# Patient Record
Sex: MALE | Race: WHITE | Employment: FULL TIME
[De-identification: names, ages, dates, MRNs, and addresses within clinical notes are randomized per-mention and may not be internally consistent; named-entity substitution may affect disease eponyms.]

---

## 2024-11-11 SDOH — HEALTH STABILITY: PHYSICAL HEALTH: ON AVERAGE, HOW MANY MINUTES DO YOU ENGAGE IN EXERCISE AT THIS LEVEL?: 10 MIN

## 2024-11-11 SDOH — HEALTH STABILITY: PHYSICAL HEALTH: ON AVERAGE, HOW MANY DAYS PER WEEK DO YOU ENGAGE IN MODERATE TO STRENUOUS EXERCISE (LIKE A BRISK WALK)?: 1 DAY

## 2024-11-12 ENCOUNTER — OFFICE VISIT (OUTPATIENT)
Dept: PRIMARY CARE CLINIC | Facility: CLINIC | Age: 39
End: 2024-11-12

## 2024-11-12 VITALS
SYSTOLIC BLOOD PRESSURE: 132 MMHG | DIASTOLIC BLOOD PRESSURE: 88 MMHG | OXYGEN SATURATION: 99 % | WEIGHT: 239 LBS | HEIGHT: 71 IN | BODY MASS INDEX: 33.46 KG/M2 | HEART RATE: 86 BPM

## 2024-11-12 DIAGNOSIS — Z00.00 ENCOUNTER FOR MEDICAL EXAMINATION TO ESTABLISH CARE: ICD-10-CM

## 2024-11-12 DIAGNOSIS — F41.9 ANXIETY AND DEPRESSION: Primary | ICD-10-CM

## 2024-11-12 DIAGNOSIS — E66.9 OBESITY, UNSPECIFIED CLASS, UNSPECIFIED OBESITY TYPE, UNSPECIFIED WHETHER SERIOUS COMORBIDITY PRESENT: ICD-10-CM

## 2024-11-12 DIAGNOSIS — F32.A ANXIETY AND DEPRESSION: Primary | ICD-10-CM

## 2024-11-12 DIAGNOSIS — G47.33 OSA (OBSTRUCTIVE SLEEP APNEA): ICD-10-CM

## 2024-11-12 DIAGNOSIS — Z82.49 FAMILY HISTORY OF EARLY CAD: ICD-10-CM

## 2024-11-12 RX ORDER — CETIRIZINE HYDROCHLORIDE 10 MG/1
10 TABLET ORAL DAILY
COMMUNITY

## 2024-11-12 SDOH — ECONOMIC STABILITY: FOOD INSECURITY: WITHIN THE PAST 12 MONTHS, THE FOOD YOU BOUGHT JUST DIDN'T LAST AND YOU DIDN'T HAVE MONEY TO GET MORE.: NEVER TRUE

## 2024-11-12 SDOH — ECONOMIC STABILITY: FOOD INSECURITY: WITHIN THE PAST 12 MONTHS, YOU WORRIED THAT YOUR FOOD WOULD RUN OUT BEFORE YOU GOT MONEY TO BUY MORE.: NEVER TRUE

## 2024-11-12 SDOH — ECONOMIC STABILITY: INCOME INSECURITY: HOW HARD IS IT FOR YOU TO PAY FOR THE VERY BASICS LIKE FOOD, HOUSING, MEDICAL CARE, AND HEATING?: NOT VERY HARD

## 2024-11-12 ASSESSMENT — COLUMBIA-SUICIDE SEVERITY RATING SCALE - C-SSRS
1. WITHIN THE PAST MONTH, HAVE YOU WISHED YOU WERE DEAD OR WISHED YOU COULD GO TO SLEEP AND NOT WAKE UP?: YES
6. HAVE YOU EVER DONE ANYTHING, STARTED TO DO ANYTHING, OR PREPARED TO DO ANYTHING TO END YOUR LIFE?: NO
2. HAVE YOU ACTUALLY HAD ANY THOUGHTS OF KILLING YOURSELF?: NO

## 2024-11-12 ASSESSMENT — PATIENT HEALTH QUESTIONNAIRE - PHQ9
8. MOVING OR SPEAKING SO SLOWLY THAT OTHER PEOPLE COULD HAVE NOTICED. OR THE OPPOSITE, BEING SO FIGETY OR RESTLESS THAT YOU HAVE BEEN MOVING AROUND A LOT MORE THAN USUAL: SEVERAL DAYS
1. LITTLE INTEREST OR PLEASURE IN DOING THINGS: MORE THAN HALF THE DAYS
SUM OF ALL RESPONSES TO PHQ QUESTIONS 1-9: 22
4. FEELING TIRED OR HAVING LITTLE ENERGY: NEARLY EVERY DAY
5. POOR APPETITE OR OVEREATING: NEARLY EVERY DAY
10. IF YOU CHECKED OFF ANY PROBLEMS, HOW DIFFICULT HAVE THESE PROBLEMS MADE IT FOR YOU TO DO YOUR WORK, TAKE CARE OF THINGS AT HOME, OR GET ALONG WITH OTHER PEOPLE: VERY DIFFICULT
SUM OF ALL RESPONSES TO PHQ QUESTIONS 1-9: 22
9. THOUGHTS THAT YOU WOULD BE BETTER OFF DEAD, OR OF HURTING YOURSELF: MORE THAN HALF THE DAYS
3. TROUBLE FALLING OR STAYING ASLEEP: NEARLY EVERY DAY
SUM OF ALL RESPONSES TO PHQ QUESTIONS 1-9: 20
7. TROUBLE CONCENTRATING ON THINGS, SUCH AS READING THE NEWSPAPER OR WATCHING TELEVISION: NEARLY EVERY DAY
6. FEELING BAD ABOUT YOURSELF - OR THAT YOU ARE A FAILURE OR HAVE LET YOURSELF OR YOUR FAMILY DOWN: NEARLY EVERY DAY
SUM OF ALL RESPONSES TO PHQ9 QUESTIONS 1 & 2: 4
2. FEELING DOWN, DEPRESSED OR HOPELESS: MORE THAN HALF THE DAYS
SUM OF ALL RESPONSES TO PHQ QUESTIONS 1-9: 22

## 2024-11-12 ASSESSMENT — ENCOUNTER SYMPTOMS: RESPIRATORY NEGATIVE: 1

## 2024-11-12 NOTE — PROGRESS NOTES
CATHY (obstructive sleep apnea)  [facility] Sleep Medicine, [facility]           The patient and/or patient representative voiced understanding and agreement with the current diagnoses, recommendations, and possible side effects.    No follow-up provider specified.      Anupama Cabrera MD

## 2024-11-26 ENCOUNTER — TELEPHONE (OUTPATIENT)
Age: 39
End: 2024-11-26

## 2024-11-27 ENCOUNTER — INITIAL CONSULT (OUTPATIENT)
Age: 39
End: 2024-11-27
Payer: COMMERCIAL

## 2024-11-27 VITALS
DIASTOLIC BLOOD PRESSURE: 84 MMHG | SYSTOLIC BLOOD PRESSURE: 126 MMHG | HEIGHT: 71 IN | WEIGHT: 235.6 LBS | BODY MASS INDEX: 32.98 KG/M2 | HEART RATE: 62 BPM

## 2024-11-27 DIAGNOSIS — R01.1 HEART MURMUR: ICD-10-CM

## 2024-11-27 DIAGNOSIS — Z76.89 ENCOUNTER TO ESTABLISH CARE WITH NEW DOCTOR: Primary | ICD-10-CM

## 2024-11-27 PROCEDURE — 93000 ELECTROCARDIOGRAM COMPLETE: CPT | Performed by: INTERNAL MEDICINE

## 2024-11-27 PROCEDURE — 99204 OFFICE O/P NEW MOD 45 MIN: CPT | Performed by: INTERNAL MEDICINE

## 2024-11-27 ASSESSMENT — ENCOUNTER SYMPTOMS
SHORTNESS OF BREATH: 0
ABDOMINAL PAIN: 0

## 2024-11-27 NOTE — PROGRESS NOTES
[facility] CARDIOLOGY  [address] [place] [address], [address]  [place] [address]  PHONE: [phone]      24    NAME:  Eric Martino  : 1985  MRN: [id number]         SUBJECTIVE:   Eric Martino is a 39 y.o. male seen for a follow up visit regarding the following:     Chief Complaint   Patient presents with    Consultation     Family history of early CAD            HPI:  Follow up  Consultation (Family history of early CAD)   .    Mr. Martino presents today for follow-up.  Patient was referred here for evaluation of a murmur.  He has a family history of subaortic membrane.  His father has been treated for this, at least 1 paternal uncle and likely to have had a subaortic membrane.  Patient has no other prior cardiac history.  He denies chest pain, shortness of breath, orthopnea, PND, palpitations, syncope or extremity swelling.  Denies any recent hospitalizations.  He is a non-smoker.  No history of hypertension, hyperlipidemia or diabetes.                      Past Medical History, Past Surgical History, Family history, Social History, and Medications were all reviewed with the patient today and updated as necessary.     Prior to Admission medications    Medication Sig Start Date End Date Taking? Authorizing Provider   cetirizine (ZYRTEC) 10 MG tablet Take 1 tablet by mouth daily   Yes Provider, MD Reuben   sertraline (ZOLOFT) 50 MG tablet Take 1 tablet by mouth daily 24  Yes Anupama Cabrera MD     Allergies   Allergen Reactions    Sulfa Antibiotics Anaphylaxis     Past Medical History:   Diagnosis Date    Depression     Headache      No past surgical history on file.  Family History   Problem Relation Age of Onset    Depression Mother     Diabetes Mother     Diabetes Father     Heart Disease Father     Cancer Maternal Grandfather         Liver    Alcohol Abuse Maternal Grandmother     Heart Disease Paternal Uncle      Social History     Tobacco Use    Smoking status: Never     Passive exposure:

## 2024-12-03 ASSESSMENT — ANXIETY QUESTIONNAIRES
5. BEING SO RESTLESS THAT IT IS HARD TO SIT STILL: SEVERAL DAYS
7. FEELING AFRAID AS IF SOMETHING AWFUL MIGHT HAPPEN: NEARLY EVERY DAY
3. WORRYING TOO MUCH ABOUT DIFFERENT THINGS: MORE THAN HALF THE DAYS
2. NOT BEING ABLE TO STOP OR CONTROL WORRYING: MORE THAN HALF THE DAYS
4. TROUBLE RELAXING: MORE THAN HALF THE DAYS
1. FEELING NERVOUS, ANXIOUS, OR ON EDGE: MORE THAN HALF THE DAYS
3. WORRYING TOO MUCH ABOUT DIFFERENT THINGS: MORE THAN HALF THE DAYS
IF YOU CHECKED OFF ANY PROBLEMS ON THIS QUESTIONNAIRE, HOW DIFFICULT HAVE THESE PROBLEMS MADE IT FOR YOU TO DO YOUR WORK, TAKE CARE OF THINGS AT HOME, OR GET ALONG WITH OTHER PEOPLE: SOMEWHAT DIFFICULT
GAD7 TOTAL SCORE: 14
6. BECOMING EASILY ANNOYED OR IRRITABLE: MORE THAN HALF THE DAYS
2. NOT BEING ABLE TO STOP OR CONTROL WORRYING: MORE THAN HALF THE DAYS
5. BEING SO RESTLESS THAT IT IS HARD TO SIT STILL: SEVERAL DAYS
4. TROUBLE RELAXING: MORE THAN HALF THE DAYS
IF YOU CHECKED OFF ANY PROBLEMS ON THIS QUESTIONNAIRE, HOW DIFFICULT HAVE THESE PROBLEMS MADE IT FOR YOU TO DO YOUR WORK, TAKE CARE OF THINGS AT HOME, OR GET ALONG WITH OTHER PEOPLE: SOMEWHAT DIFFICULT
7. FEELING AFRAID AS IF SOMETHING AWFUL MIGHT HAPPEN: NEARLY EVERY DAY
1. FEELING NERVOUS, ANXIOUS, OR ON EDGE: MORE THAN HALF THE DAYS
6. BECOMING EASILY ANNOYED OR IRRITABLE: MORE THAN HALF THE DAYS

## 2024-12-03 ASSESSMENT — PATIENT HEALTH QUESTIONNAIRE - PHQ9
9. THOUGHTS THAT YOU WOULD BE BETTER OFF DEAD, OR OF HURTING YOURSELF: SEVERAL DAYS
3. TROUBLE FALLING OR STAYING ASLEEP: NEARLY EVERY DAY
7. TROUBLE CONCENTRATING ON THINGS, SUCH AS READING THE NEWSPAPER OR WATCHING TELEVISION: MORE THAN HALF THE DAYS
4. FEELING TIRED OR HAVING LITTLE ENERGY: NEARLY EVERY DAY
3. TROUBLE FALLING OR STAYING ASLEEP: NEARLY EVERY DAY
5. POOR APPETITE OR OVEREATING: MORE THAN HALF THE DAYS
9. THOUGHTS THAT YOU WOULD BE BETTER OFF DEAD, OR OF HURTING YOURSELF: SEVERAL DAYS
4. FEELING TIRED OR HAVING LITTLE ENERGY: NEARLY EVERY DAY
1. LITTLE INTEREST OR PLEASURE IN DOING THINGS: SEVERAL DAYS
10. IF YOU CHECKED OFF ANY PROBLEMS, HOW DIFFICULT HAVE THESE PROBLEMS MADE IT FOR YOU TO DO YOUR WORK, TAKE CARE OF THINGS AT HOME, OR GET ALONG WITH OTHER PEOPLE: SOMEWHAT DIFFICULT
8. MOVING OR SPEAKING SO SLOWLY THAT OTHER PEOPLE COULD HAVE NOTICED. OR THE OPPOSITE, BEING SO FIGETY OR RESTLESS THAT YOU HAVE BEEN MOVING AROUND A LOT MORE THAN USUAL: MORE THAN HALF THE DAYS
SUM OF ALL RESPONSES TO PHQ QUESTIONS 1-9: 17
6. FEELING BAD ABOUT YOURSELF - OR THAT YOU ARE A FAILURE OR HAVE LET YOURSELF OR YOUR FAMILY DOWN: SEVERAL DAYS
8. MOVING OR SPEAKING SO SLOWLY THAT OTHER PEOPLE COULD HAVE NOTICED. OR THE OPPOSITE - BEING SO FIDGETY OR RESTLESS THAT YOU HAVE BEEN MOVING AROUND A LOT MORE THAN USUAL: MORE THAN HALF THE DAYS
7. TROUBLE CONCENTRATING ON THINGS, SUCH AS READING THE NEWSPAPER OR WATCHING TELEVISION: MORE THAN HALF THE DAYS
SUM OF ALL RESPONSES TO PHQ QUESTIONS 1-9: 16
6. FEELING BAD ABOUT YOURSELF - OR THAT YOU ARE A FAILURE OR HAVE LET YOURSELF OR YOUR FAMILY DOWN: SEVERAL DAYS
2. FEELING DOWN, DEPRESSED OR HOPELESS: MORE THAN HALF THE DAYS
SUM OF ALL RESPONSES TO PHQ QUESTIONS 1-9: 17
5. POOR APPETITE OR OVEREATING: MORE THAN HALF THE DAYS
SUM OF ALL RESPONSES TO PHQ9 QUESTIONS 1 & 2: 3
10. IF YOU CHECKED OFF ANY PROBLEMS, HOW DIFFICULT HAVE THESE PROBLEMS MADE IT FOR YOU TO DO YOUR WORK, TAKE CARE OF THINGS AT HOME, OR GET ALONG WITH OTHER PEOPLE: SOMEWHAT DIFFICULT
1. LITTLE INTEREST OR PLEASURE IN DOING THINGS: SEVERAL DAYS
2. FEELING DOWN, DEPRESSED OR HOPELESS: MORE THAN HALF THE DAYS

## 2024-12-03 ASSESSMENT — COLUMBIA-SUICIDE SEVERITY RATING SCALE - C-SSRS
1. IN THE PAST MONTH, HAVE YOU WISHED YOU WERE DEAD OR WISHED YOU COULD GO TO SLEEP AND NOT WAKE UP?: YES
6. IN YOUR LIFETIME, HAVE YOU EVER DONE ANYTHING, STARTED TO DO ANYTHING, OR PREPARED TO DO ANYTHING TO END YOUR LIFE?: NO
2. IN THE PAST MONTH, HAVE YOU ACTUALLY HAD ANY THOUGHTS OF KILLING YOURSELF?: NO

## 2024-12-04 ENCOUNTER — TELEMEDICINE (OUTPATIENT)
Dept: BEHAVIORAL/MENTAL HEALTH CLINIC | Facility: CLINIC | Age: 39
End: 2024-12-04
Payer: COMMERCIAL

## 2024-12-04 DIAGNOSIS — F41.9 ANXIETY DISORDER, UNSPECIFIED TYPE: Primary | ICD-10-CM

## 2024-12-04 DIAGNOSIS — G47.00 INSOMNIA, UNSPECIFIED TYPE: ICD-10-CM

## 2024-12-04 DIAGNOSIS — F33.1 MAJOR DEPRESSIVE DISORDER, RECURRENT, MODERATE (HCC): ICD-10-CM

## 2024-12-04 PROCEDURE — 90792 PSYCH DIAG EVAL W/MED SRVCS: CPT | Performed by: PSYCHIATRY & NEUROLOGY

## 2024-12-04 RX ORDER — TRAZODONE HYDROCHLORIDE 50 MG/1
50 TABLET, FILM COATED ORAL NIGHTLY
Qty: 30 TABLET | Refills: 1 | Status: SHIPPED | OUTPATIENT
Start: 2024-12-04

## 2024-12-04 NOTE — PROGRESS NOTES
appears stated age  Head: Normocephalic, atraumatic  Eyes: conjunctiva clear, PERRL, EOMI  Ears: Normal structure, intact.  Lungs/Heart: No observed wheezing, respirations unlabored. Normal chest rise.  Musculoskeletal: Normal strength and range of motion. No abnormal movements.    MENTAL STATUS EXAM:  Orientation: Oriented to person, place, time and situation  Appearance: Well groomed, good hygiene  Psychomotor: No slowing or agitation  Speech: Normal rate, rhythm, tone and quantity, no slurring  Mood: pleasant  Affect: congruent  Thought content: No hallucinations or delusions, paranoia  SI/plan: denies  HI/plan: denies  Thought process: Linear and goal directed  Insight/Judgement: fair / fair  Attention: intact  Memory: grossly intact  Gait: wnl    ASSESSMENT/PLAN:  Eric Martino is a 39 y.o. old male who has a psychiatric history of depression, anxiety who presented for evaluation. Has Zoloft from PCP.   Diagnosis Orders   1. Anxiety disorder, unspecified type  traZODone (DESYREL) 50 MG tablet      2. Major depressive disorder, recurrent, moderate (HCC)        3. Insomnia, unspecified type  traZODone (DESYREL) 50 MG tablet      Psychotherapy:  Defer at this time.  Pertinent labs/imaging:  No results found for: \"TRIG\", \"HDL\", \"LDL\", \"CHOL\", \"GLUCOSE\"  No results found for: \"TSH\", \"TSHFT4\", \"TSHELE\", \"TLG2PHD\", \"TSHHS\"  Questionnaires:   PHQ:      12/3/2024    10:26 PM 11/12/2024     9:35 AM   PHQ-9    Little interest or pleasure in doing things 1 2   Feeling down, depressed, or hopeless 2 2   Trouble falling or staying asleep, or sleeping too much 3 3   Feeling tired or having little energy 3 3   Poor appetite or overeating 2 3   Feeling bad about yourself - or that you are a failure or have let yourself or your family down 1 3   Trouble concentrating on things, such as reading the newspaper or watching television 2 3   Moving or speaking so slowly that other people could have noticed. Or the opposite - being so

## 2024-12-10 ENCOUNTER — TELEMEDICINE (OUTPATIENT)
Dept: PRIMARY CARE CLINIC | Facility: CLINIC | Age: 39
End: 2024-12-10
Payer: COMMERCIAL

## 2024-12-10 DIAGNOSIS — F41.9 ANXIETY AND DEPRESSION: Primary | ICD-10-CM

## 2024-12-10 DIAGNOSIS — E66.9 OBESITY, UNSPECIFIED CLASS, UNSPECIFIED OBESITY TYPE, UNSPECIFIED WHETHER SERIOUS COMORBIDITY PRESENT: ICD-10-CM

## 2024-12-10 DIAGNOSIS — F32.A ANXIETY AND DEPRESSION: Primary | ICD-10-CM

## 2024-12-10 DIAGNOSIS — Z82.49 FAMILY HISTORY OF EARLY CAD: ICD-10-CM

## 2024-12-10 PROCEDURE — 99214 OFFICE O/P EST MOD 30 MIN: CPT | Performed by: INTERNAL MEDICINE

## 2024-12-10 ASSESSMENT — ENCOUNTER SYMPTOMS: RESPIRATORY NEGATIVE: 1

## 2024-12-10 NOTE — PROGRESS NOTES
FOLLOW UP VISIT    Subjective:    Eric Martino (: 1985) is a 39 y.o., male,   No chief complaint on file.      HPI:  HPI  39 year old in to follow up.  He was started on trazadone and he has seen Psych   Depression not suicidal has no thoughts of harming self or others   Anxiety   Insomnia on trazadone  Headache  Allergies on meds  Obesity   CATHY not on CPAP   Family history of CAD uncles AS dying from this  HCM   due for CPE and vaccines     The following portions of the patient's history were reviewed and updated as appropriate:      Past Medical History:   Diagnosis Date    Depression     Headache        No past surgical history on file.    Family History   Problem Relation Age of Onset    Depression Mother     Diabetes Mother     Diabetes Father     Heart Disease Father     Cancer Maternal Grandfather         Liver    Alcohol Abuse Maternal Grandmother     Heart Disease Paternal Uncle        Social History     Socioeconomic History    Marital status:      Spouse name: Not on file    Number of children: Not on file    Years of education: Not on file    Highest education level: Not on file   Occupational History    Not on file   Tobacco Use    Smoking status: Never     Passive exposure: Never    Smokeless tobacco: Never   Substance and Sexual Activity    Alcohol use: Not Currently    Drug use: Never    Sexual activity: Yes     Partners: Female   Other Topics Concern    Not on file   Social History Narrative    Not on file     Social Determinants of Health     Financial Resource Strain: Low Risk  (2024)    Overall Financial Resource Strain (CARDIA)     Difficulty of Paying Living Expenses: Not very hard   Food Insecurity: No Food Insecurity (2024)    Hunger Vital Sign     Worried About Running Out of Food in the Last Year: Never true     Ran Out of Food in the Last Year: Never true   Transportation Needs: Unknown (2024)    PRAPARE - Transportation     Lack of Transportation

## 2024-12-11 ENCOUNTER — TELEMEDICINE (OUTPATIENT)
Dept: BEHAVIORAL/MENTAL HEALTH CLINIC | Facility: CLINIC | Age: 39
End: 2024-12-11
Payer: COMMERCIAL

## 2024-12-11 DIAGNOSIS — G47.00 INSOMNIA, UNSPECIFIED TYPE: ICD-10-CM

## 2024-12-11 DIAGNOSIS — F41.9 ANXIETY DISORDER, UNSPECIFIED TYPE: ICD-10-CM

## 2024-12-11 DIAGNOSIS — F33.0 MILD EPISODE OF RECURRENT MAJOR DEPRESSIVE DISORDER (HCC): Primary | ICD-10-CM

## 2024-12-11 PROCEDURE — 99214 OFFICE O/P EST MOD 30 MIN: CPT | Performed by: PSYCHIATRY & NEUROLOGY

## 2024-12-11 RX ORDER — BUPROPION HYDROCHLORIDE 100 MG/1
100 TABLET, EXTENDED RELEASE ORAL 2 TIMES DAILY
Qty: 60 TABLET | Refills: 1 | Status: SHIPPED | OUTPATIENT
Start: 2024-12-11

## 2024-12-11 ASSESSMENT — ANXIETY QUESTIONNAIRES
6. BECOMING EASILY ANNOYED OR IRRITABLE: MORE THAN HALF THE DAYS
IF YOU CHECKED OFF ANY PROBLEMS ON THIS QUESTIONNAIRE, HOW DIFFICULT HAVE THESE PROBLEMS MADE IT FOR YOU TO DO YOUR WORK, TAKE CARE OF THINGS AT HOME, OR GET ALONG WITH OTHER PEOPLE: SOMEWHAT DIFFICULT
7. FEELING AFRAID AS IF SOMETHING AWFUL MIGHT HAPPEN: SEVERAL DAYS
3. WORRYING TOO MUCH ABOUT DIFFERENT THINGS: SEVERAL DAYS
GAD7 TOTAL SCORE: 8
3. WORRYING TOO MUCH ABOUT DIFFERENT THINGS: SEVERAL DAYS
4. TROUBLE RELAXING: SEVERAL DAYS
IF YOU CHECKED OFF ANY PROBLEMS ON THIS QUESTIONNAIRE, HOW DIFFICULT HAVE THESE PROBLEMS MADE IT FOR YOU TO DO YOUR WORK, TAKE CARE OF THINGS AT HOME, OR GET ALONG WITH OTHER PEOPLE: SOMEWHAT DIFFICULT
2. NOT BEING ABLE TO STOP OR CONTROL WORRYING: SEVERAL DAYS
1. FEELING NERVOUS, ANXIOUS, OR ON EDGE: SEVERAL DAYS
2. NOT BEING ABLE TO STOP OR CONTROL WORRYING: SEVERAL DAYS
5. BEING SO RESTLESS THAT IT IS HARD TO SIT STILL: SEVERAL DAYS
1. FEELING NERVOUS, ANXIOUS, OR ON EDGE: SEVERAL DAYS
7. FEELING AFRAID AS IF SOMETHING AWFUL MIGHT HAPPEN: SEVERAL DAYS
5. BEING SO RESTLESS THAT IT IS HARD TO SIT STILL: SEVERAL DAYS
6. BECOMING EASILY ANNOYED OR IRRITABLE: MORE THAN HALF THE DAYS
4. TROUBLE RELAXING: SEVERAL DAYS

## 2024-12-11 ASSESSMENT — PATIENT HEALTH QUESTIONNAIRE - PHQ9
8. MOVING OR SPEAKING SO SLOWLY THAT OTHER PEOPLE COULD HAVE NOTICED. OR THE OPPOSITE - BEING SO FIDGETY OR RESTLESS THAT YOU HAVE BEEN MOVING AROUND A LOT MORE THAN USUAL: SEVERAL DAYS
6. FEELING BAD ABOUT YOURSELF - OR THAT YOU ARE A FAILURE OR HAVE LET YOURSELF OR YOUR FAMILY DOWN: SEVERAL DAYS
9. THOUGHTS THAT YOU WOULD BE BETTER OFF DEAD, OR OF HURTING YOURSELF: NOT AT ALL
SUM OF ALL RESPONSES TO PHQ QUESTIONS 1-9: 5
4. FEELING TIRED OR HAVING LITTLE ENERGY: NOT AT ALL
SUM OF ALL RESPONSES TO PHQ QUESTIONS 1-9: 5
SUM OF ALL RESPONSES TO PHQ QUESTIONS 1-9: 5
4. FEELING TIRED OR HAVING LITTLE ENERGY: NOT AT ALL
2. FEELING DOWN, DEPRESSED OR HOPELESS: NOT AT ALL
9. THOUGHTS THAT YOU WOULD BE BETTER OFF DEAD, OR OF HURTING YOURSELF: NOT AT ALL
1. LITTLE INTEREST OR PLEASURE IN DOING THINGS: SEVERAL DAYS
SUM OF ALL RESPONSES TO PHQ9 QUESTIONS 1 & 2: 1
5. POOR APPETITE OR OVEREATING: SEVERAL DAYS
5. POOR APPETITE OR OVEREATING: SEVERAL DAYS
2. FEELING DOWN, DEPRESSED OR HOPELESS: NOT AT ALL
3. TROUBLE FALLING OR STAYING ASLEEP: NOT AT ALL
7. TROUBLE CONCENTRATING ON THINGS, SUCH AS READING THE NEWSPAPER OR WATCHING TELEVISION: SEVERAL DAYS
10. IF YOU CHECKED OFF ANY PROBLEMS, HOW DIFFICULT HAVE THESE PROBLEMS MADE IT FOR YOU TO DO YOUR WORK, TAKE CARE OF THINGS AT HOME, OR GET ALONG WITH OTHER PEOPLE: NOT DIFFICULT AT ALL
10. IF YOU CHECKED OFF ANY PROBLEMS, HOW DIFFICULT HAVE THESE PROBLEMS MADE IT FOR YOU TO DO YOUR WORK, TAKE CARE OF THINGS AT HOME, OR GET ALONG WITH OTHER PEOPLE: NOT DIFFICULT AT ALL
SUM OF ALL RESPONSES TO PHQ QUESTIONS 1-9: 5
1. LITTLE INTEREST OR PLEASURE IN DOING THINGS: SEVERAL DAYS
3. TROUBLE FALLING OR STAYING ASLEEP: NOT AT ALL
SUM OF ALL RESPONSES TO PHQ QUESTIONS 1-9: 5
8. MOVING OR SPEAKING SO SLOWLY THAT OTHER PEOPLE COULD HAVE NOTICED. OR THE OPPOSITE, BEING SO FIGETY OR RESTLESS THAT YOU HAVE BEEN MOVING AROUND A LOT MORE THAN USUAL: SEVERAL DAYS
7. TROUBLE CONCENTRATING ON THINGS, SUCH AS READING THE NEWSPAPER OR WATCHING TELEVISION: SEVERAL DAYS
6. FEELING BAD ABOUT YOURSELF - OR THAT YOU ARE A FAILURE OR HAVE LET YOURSELF OR YOUR FAMILY DOWN: SEVERAL DAYS

## 2024-12-11 NOTE — PROGRESS NOTES
home, or get along with other people? 0 1 2     GAD7:      12/11/2024     2:26 PM 12/3/2024    10:25 PM   TAYLOR-7 SCREENING   Feeling nervous, anxious, or on edge Several days More than half the days   Not being able to stop or control worrying Several days More than half the days   Worrying too much about different things Several days More than half the days   Trouble relaxing Several days More than half the days   Being so restless that it is hard to sit still Several days Several days   Becoming easily annoyed or irritable More than half the days More than half the days   Feeling afraid as if something awful might happen Several days Nearly every day   TAYLOR-7 Total Score 8 14   How difficult have these problems made it for you to do your work, take care of things at home, or get along with other people? Somewhat difficult Somewhat difficult     Return to Clinic: 1 month OR sooner if needed    I have reviewed the SCRIPTS database report for this patient as required for prescription of controlled substances and did not note any discrepancies.  Medication side effects were discussed and benefits v. risks were presented. Treatment plan was discussed and agreed to by patient.  Diagnoses considered in this plan are \"working diagnoses\" and subject to change with additional information obtained over the course of additional time spent with patient and/or additional collateral information.  Currently, considering risks and protective factors, this patient has a:  low risk of suicide  low risk of homicide  Crisis Plan:  Patient encouraged to call 911 and /or go to the closest Emergency Department in case of agitation, severe anxiety, psychosis, margie, suicidal or homicidal urges, worrisome side effects to medications or other emergencies. Patient verbalized understanding of this plan and agreed to it.  This note has been completed using [employer] Software and while attempts have been made to ensure accuracy,

## 2025-01-08 ENCOUNTER — TELEMEDICINE (OUTPATIENT)
Dept: BEHAVIORAL/MENTAL HEALTH CLINIC | Facility: CLINIC | Age: 40
End: 2025-01-08
Payer: COMMERCIAL

## 2025-01-08 DIAGNOSIS — F33.0 MILD EPISODE OF RECURRENT MAJOR DEPRESSIVE DISORDER (HCC): ICD-10-CM

## 2025-01-08 DIAGNOSIS — F41.9 ANXIETY DISORDER, UNSPECIFIED TYPE: ICD-10-CM

## 2025-01-08 DIAGNOSIS — G47.00 INSOMNIA, UNSPECIFIED TYPE: ICD-10-CM

## 2025-01-08 PROCEDURE — 90833 PSYTX W PT W E/M 30 MIN: CPT | Performed by: PSYCHIATRY & NEUROLOGY

## 2025-01-08 PROCEDURE — 99214 OFFICE O/P EST MOD 30 MIN: CPT | Performed by: PSYCHIATRY & NEUROLOGY

## 2025-01-08 RX ORDER — TRAZODONE HYDROCHLORIDE 50 MG/1
50 TABLET, FILM COATED ORAL NIGHTLY
Qty: 30 TABLET | Refills: 1 | Status: SHIPPED | OUTPATIENT
Start: 2025-01-08

## 2025-01-08 RX ORDER — BUPROPION HYDROCHLORIDE 150 MG/1
150 TABLET ORAL EVERY MORNING
Qty: 30 TABLET | Refills: 1 | Status: SHIPPED | OUTPATIENT
Start: 2025-01-08

## 2025-01-08 NOTE — PROGRESS NOTES
PROGRESS NOTE  Patient: Eric Martino         Date: 2025   MR#: [id number]              : 1985  IDENTIFYING INFORMATION: This is a 39 y.o. old male who is a patient whom presents to clinic for initial evaluation.   Eric Martino was evaluated through a synchronous (real-time) audio-video encounter. The patient (or guardian if applicable) is aware that this is a billable service, which includes applicable co-pays. This Virtual Visit was conducted with patient's (and/or legal guardian's) consent. Patient identification was verified, and a caregiver was present when appropriate.   The patient was located at Home: [address] [place], [place] [id number]  Provider was located at Facility (Appt Dept): [address] [place] [id number]  Confirm you are appropriately licensed, registered, or certified to deliver care in the state where the patient is located as indicated above. If you are not or unsure, please re-schedule the visit: Yes, I confirm.   CHIEF COMPLAINT: mood, anxiety  HISTORY OF PRESENT ILLNESS:  Interval History:  Ongoing issues with anxiety and related mood. Continues to have issues with stress from work. Not able to tolerate Zoloft due to sexual side effects. Stopped taking and Wellbutrin. Has been sleeping better with Trazodone. Discussed trial of Wellbutrin XL for mood, anxiety. Active listening was utilized. Encourage coping skills and relaxation methods. Good sleep hygiene measures recommended. [employer] was given. Supportive measures provided.   Current Symptoms  Duration: chronic  Sleep: improved  Appetite: normal  Anxiety: endorses  Mood: fair  Compliance with medications: endorses  Side effects from the medications: denies  Current stressors: teaching - work    Memory changes/ADL's if applicable: baseline  Substance History: EtOH: denies Illicit Substances denies  Family History: mother, brother - depression, suicides or attempts: mother - attempted  substance Abuse: mother -

## 2025-03-04 ENCOUNTER — TELEMEDICINE (OUTPATIENT)
Dept: BEHAVIORAL/MENTAL HEALTH CLINIC | Facility: CLINIC | Age: 40
End: 2025-03-04
Payer: COMMERCIAL

## 2025-03-04 DIAGNOSIS — F43.23 ADJUSTMENT DISORDER WITH MIXED ANXIETY AND DEPRESSED MOOD: Primary | ICD-10-CM

## 2025-03-04 DIAGNOSIS — G47.00 INSOMNIA, UNSPECIFIED TYPE: ICD-10-CM

## 2025-03-04 DIAGNOSIS — F41.9 ANXIETY DISORDER, UNSPECIFIED TYPE: ICD-10-CM

## 2025-03-04 PROCEDURE — 99214 OFFICE O/P EST MOD 30 MIN: CPT | Performed by: PSYCHIATRY & NEUROLOGY

## 2025-03-04 RX ORDER — TRAZODONE HYDROCHLORIDE 50 MG/1
50 TABLET ORAL NIGHTLY
Qty: 90 TABLET | Refills: 1 | Status: SHIPPED | OUTPATIENT
Start: 2025-03-04

## 2025-03-04 ASSESSMENT — PATIENT HEALTH QUESTIONNAIRE - PHQ9
8. MOVING OR SPEAKING SO SLOWLY THAT OTHER PEOPLE COULD HAVE NOTICED. OR THE OPPOSITE, BEING SO FIGETY OR RESTLESS THAT YOU HAVE BEEN MOVING AROUND A LOT MORE THAN USUAL: NOT AT ALL
9. THOUGHTS THAT YOU WOULD BE BETTER OFF DEAD, OR OF HURTING YOURSELF: NOT AT ALL
6. FEELING BAD ABOUT YOURSELF - OR THAT YOU ARE A FAILURE OR HAVE LET YOURSELF OR YOUR FAMILY DOWN: SEVERAL DAYS
2. FEELING DOWN, DEPRESSED OR HOPELESS: SEVERAL DAYS
10. IF YOU CHECKED OFF ANY PROBLEMS, HOW DIFFICULT HAVE THESE PROBLEMS MADE IT FOR YOU TO DO YOUR WORK, TAKE CARE OF THINGS AT HOME, OR GET ALONG WITH OTHER PEOPLE: NOT DIFFICULT AT ALL
8. MOVING OR SPEAKING SO SLOWLY THAT OTHER PEOPLE COULD HAVE NOTICED. OR THE OPPOSITE - BEING SO FIDGETY OR RESTLESS THAT YOU HAVE BEEN MOVING AROUND A LOT MORE THAN USUAL: NOT AT ALL
SUM OF ALL RESPONSES TO PHQ QUESTIONS 1-9: 4
1. LITTLE INTEREST OR PLEASURE IN DOING THINGS: SEVERAL DAYS
5. POOR APPETITE OR OVEREATING: NOT AT ALL
7. TROUBLE CONCENTRATING ON THINGS, SUCH AS READING THE NEWSPAPER OR WATCHING TELEVISION: SEVERAL DAYS
4. FEELING TIRED OR HAVING LITTLE ENERGY: NOT AT ALL
1. LITTLE INTEREST OR PLEASURE IN DOING THINGS: SEVERAL DAYS
SUM OF ALL RESPONSES TO PHQ QUESTIONS 1-9: 4
10. IF YOU CHECKED OFF ANY PROBLEMS, HOW DIFFICULT HAVE THESE PROBLEMS MADE IT FOR YOU TO DO YOUR WORK, TAKE CARE OF THINGS AT HOME, OR GET ALONG WITH OTHER PEOPLE: NOT DIFFICULT AT ALL
7. TROUBLE CONCENTRATING ON THINGS, SUCH AS READING THE NEWSPAPER OR WATCHING TELEVISION: SEVERAL DAYS
SUM OF ALL RESPONSES TO PHQ QUESTIONS 1-9: 4
4. FEELING TIRED OR HAVING LITTLE ENERGY: NOT AT ALL
5. POOR APPETITE OR OVEREATING: NOT AT ALL
2. FEELING DOWN, DEPRESSED OR HOPELESS: SEVERAL DAYS
6. FEELING BAD ABOUT YOURSELF - OR THAT YOU ARE A FAILURE OR HAVE LET YOURSELF OR YOUR FAMILY DOWN: SEVERAL DAYS
3. TROUBLE FALLING OR STAYING ASLEEP: NOT AT ALL
3. TROUBLE FALLING OR STAYING ASLEEP: NOT AT ALL
9. THOUGHTS THAT YOU WOULD BE BETTER OFF DEAD, OR OF HURTING YOURSELF: NOT AT ALL

## 2025-03-04 NOTE — PROGRESS NOTES
half the days   Not being able to stop or control worrying Several days More than half the days   Worrying too much about different things Several days More than half the days   Trouble relaxing Several days More than half the days   Being so restless that it is hard to sit still Several days Several days   Becoming easily annoyed or irritable More than half the days More than half the days   Feeling afraid as if something awful might happen Several days Nearly every day   TAYLOR-7 Total Score 8 14   How difficult have these problems made it for you to do your work, take care of things at home, or get along with other people? Somewhat difficult Somewhat difficult     Return to Clinic: as needed     I have reviewed the SCRIPTS database report for this patient as required for prescription of controlled substances and did not note any discrepancies.  Medication side effects were discussed and benefits v. risks were presented. Treatment plan was discussed and agreed to by patient.  Diagnoses considered in this plan are \"working diagnoses\" and subject to change with additional information obtained over the course of additional time spent with patient and/or additional collateral information.  Currently, considering risks and protective factors, this patient has a:  low risk of suicide  low risk of homicide  Crisis Plan:  Patient encouraged to call 911 and /or go to the closest Emergency Department in case of agitation, severe anxiety, psychosis, margie, suicidal or homicidal urges, worrisome side effects to medications or other emergencies. Patient verbalized understanding of this plan and agreed to it.  This note has been completed using [employer] Dictation Software and while attempts have been made to ensure accuracy, certain words and phrases may not be transcribed as intended.

## 2025-03-18 ENCOUNTER — RESULTS FOLLOW-UP (OUTPATIENT)
Age: 40
End: 2025-03-18

## 2025-03-19 ENCOUNTER — OFFICE VISIT (OUTPATIENT)
Age: 40
End: 2025-03-19
Payer: COMMERCIAL

## 2025-03-19 VITALS
DIASTOLIC BLOOD PRESSURE: 82 MMHG | HEIGHT: 71 IN | BODY MASS INDEX: 34.02 KG/M2 | SYSTOLIC BLOOD PRESSURE: 126 MMHG | HEART RATE: 78 BPM | WEIGHT: 243 LBS

## 2025-03-19 DIAGNOSIS — R01.1 HEART MURMUR: Primary | ICD-10-CM

## 2025-03-19 PROCEDURE — 99213 OFFICE O/P EST LOW 20 MIN: CPT | Performed by: INTERNAL MEDICINE

## 2025-03-19 ASSESSMENT — ENCOUNTER SYMPTOMS
ABDOMINAL PAIN: 0
SHORTNESS OF BREATH: 0

## 2025-03-19 NOTE — PROGRESS NOTES
OUTSIDE RECORDS REVIEW    Records from outside providers have been reviewed and summarized as noted in the HPI, past history and data review sections of this note, and reviewed with patient. .       ASSESSMENT and PLAN    Eric was seen today for heart murmur.    Diagnoses and all orders for this visit:    Heart murmur          IMPRESSION:    Mr. Gomez presents today for follow-up.  He stable from cardiac standpoint today.  His echo did not show any evidence of a subaortic membrane, LVOT obstruction or flow acceleration.  Will plan for as needed follow-up.  He needs no further cardiac testing or treatment at this time.        No follow-ups on file.    Personal time spent with chart review, interview/physical examination, documentation and coordination of care totals 28 minutes.       Thank you for allowing me to participate in this patient's care.  Please call or contact me if there are any questions or concerns regarding the above.      Timothy Edwards III, MD  03/19/25  9:26 AM

## 2025-05-29 ENCOUNTER — OFFICE VISIT (OUTPATIENT)
Dept: PRIMARY CARE CLINIC | Facility: CLINIC | Age: 40
End: 2025-05-29

## 2025-05-29 DIAGNOSIS — F33.1 MAJOR DEPRESSIVE DISORDER, RECURRENT, MODERATE (HCC): ICD-10-CM

## 2025-05-29 DIAGNOSIS — G47.33 OSA (OBSTRUCTIVE SLEEP APNEA): Primary | ICD-10-CM

## 2025-06-17 ENCOUNTER — OFFICE VISIT (OUTPATIENT)
Dept: PRIMARY CARE CLINIC | Facility: CLINIC | Age: 40
End: 2025-06-17
Payer: COMMERCIAL

## 2025-06-17 VITALS
BODY MASS INDEX: 33.6 KG/M2 | SYSTOLIC BLOOD PRESSURE: 132 MMHG | HEIGHT: 71 IN | WEIGHT: 240 LBS | OXYGEN SATURATION: 99 % | DIASTOLIC BLOOD PRESSURE: 82 MMHG | HEART RATE: 87 BPM

## 2025-06-17 DIAGNOSIS — R73.9 HIGH BLOOD SUGAR: ICD-10-CM

## 2025-06-17 DIAGNOSIS — Z00.00 ANNUAL PHYSICAL EXAM: Primary | ICD-10-CM

## 2025-06-17 DIAGNOSIS — L98.9 SKIN DISORDER: ICD-10-CM

## 2025-06-17 DIAGNOSIS — M79.673 PAIN OF FOOT, UNSPECIFIED LATERALITY: ICD-10-CM

## 2025-06-17 DIAGNOSIS — Z00.00 ANNUAL PHYSICAL EXAM: ICD-10-CM

## 2025-06-17 DIAGNOSIS — M25.519 SHOULDER PAIN, UNSPECIFIED CHRONICITY, UNSPECIFIED LATERALITY: ICD-10-CM

## 2025-06-17 DIAGNOSIS — F33.1 MAJOR DEPRESSIVE DISORDER, RECURRENT, MODERATE (HCC): ICD-10-CM

## 2025-06-17 LAB
ALBUMIN SERPL-MCNC: 4.1 G/DL (ref 3.5–5)
ALBUMIN/GLOB SERPL: 1.3 (ref 1–1.9)
ALP SERPL-CCNC: 200 U/L (ref 40–129)
ALT SERPL-CCNC: 206 U/L (ref 8–55)
ANION GAP SERPL CALC-SCNC: 15 MMOL/L (ref 7–16)
APPEARANCE UR: CLEAR
AST SERPL-CCNC: 146 U/L (ref 15–37)
BACTERIA URNS QL MICRO: NEGATIVE /HPF
BASOPHILS # BLD: 0.04 K/UL (ref 0–0.2)
BASOPHILS NFR BLD: 0.6 % (ref 0–2)
BILIRUB SERPL-MCNC: 0.4 MG/DL (ref 0–1.2)
BILIRUB UR QL: NEGATIVE
BUN SERPL-MCNC: 12 MG/DL (ref 6–23)
CALCIUM SERPL-MCNC: 9.8 MG/DL (ref 8.8–10.2)
CASTS URNS QL MICRO: 0 /LPF
CHLORIDE SERPL-SCNC: 99 MMOL/L (ref 98–107)
CHOLEST SERPL-MCNC: 225 MG/DL (ref 0–200)
CO2 SERPL-SCNC: 21 MMOL/L (ref 20–29)
COLOR UR: ABNORMAL
CREAT SERPL-MCNC: 0.9 MG/DL (ref 0.8–1.3)
CRYSTALS URNS QL MICRO: 0 /LPF
DIFFERENTIAL METHOD BLD: ABNORMAL
EOSINOPHIL # BLD: 0.12 K/UL (ref 0–0.8)
EOSINOPHIL NFR BLD: 1.8 % (ref 0.5–7.8)
EPI CELLS #/AREA URNS HPF: ABNORMAL /HPF (ref 0–5)
ERYTHROCYTE [DISTWIDTH] IN BLOOD BY AUTOMATED COUNT: 13.7 % (ref 11.9–14.6)
EST. AVERAGE GLUCOSE BLD GHB EST-MCNC: 243 MG/DL
GLOBULIN SER CALC-MCNC: 3.1 G/DL (ref 2.3–3.5)
GLUCOSE SERPL-MCNC: 435 MG/DL (ref 70–99)
GLUCOSE UR STRIP.AUTO-MCNC: >1000 MG/DL
HBA1C MFR BLD: 10.1 % (ref 0–5.6)
HCT VFR BLD AUTO: 43.7 % (ref 41.1–50.3)
HDLC SERPL-MCNC: 29 MG/DL (ref 40–60)
HDLC SERPL: 7.7 (ref 0–5)
HGB BLD-MCNC: 14.9 G/DL (ref 13.6–17.2)
HGB UR QL STRIP: NEGATIVE
HYALINE CASTS URNS QL MICRO: ABNORMAL /LPF
IMM GRANULOCYTES # BLD AUTO: 0.03 K/UL (ref 0–0.5)
IMM GRANULOCYTES NFR BLD AUTO: 0.4 % (ref 0–5)
KETONES UR QL STRIP.AUTO: NEGATIVE MG/DL
LDLC SERPL CALC-MCNC: ABNORMAL MG/DL (ref 0–100)
LDLC SERPL DIRECT ASSAY-MCNC: 106 MG/DL (ref 0–100)
LEUKOCYTE ESTERASE UR QL STRIP.AUTO: NEGATIVE
LYMPHOCYTES # BLD: 2.55 K/UL (ref 0.5–4.6)
LYMPHOCYTES NFR BLD: 38.1 % (ref 13–44)
MCH RBC QN AUTO: 27.1 PG (ref 26.1–32.9)
MCHC RBC AUTO-ENTMCNC: 34.1 G/DL (ref 31.4–35)
MCV RBC AUTO: 79.5 FL (ref 82–102)
MONOCYTES # BLD: 0.49 K/UL (ref 0.1–1.3)
MONOCYTES NFR BLD: 7.3 % (ref 4–12)
MUCOUS THREADS URNS QL MICRO: 0 /LPF
NEUTS SEG # BLD: 3.46 K/UL (ref 1.7–8.2)
NEUTS SEG NFR BLD: 51.8 % (ref 43–78)
NITRITE UR QL STRIP.AUTO: NEGATIVE
NRBC # BLD: 0 K/UL (ref 0–0.2)
PH UR STRIP: 5.5 (ref 5–9)
PLATELET # BLD AUTO: 226 K/UL (ref 150–450)
PMV BLD AUTO: 11.2 FL (ref 9.4–12.3)
POTASSIUM SERPL-SCNC: 4.4 MMOL/L (ref 3.5–5.1)
PROT SERPL-MCNC: 7.2 G/DL (ref 6.3–8.2)
PROT UR STRIP-MCNC: NEGATIVE MG/DL
RBC # BLD AUTO: 5.5 M/UL (ref 4.23–5.6)
RBC #/AREA URNS HPF: ABNORMAL /HPF (ref 0–5)
SODIUM SERPL-SCNC: 136 MMOL/L (ref 136–145)
SP GR UR REFRACTOMETRY: >1.035 (ref 1–1.02)
TRIGL SERPL-MCNC: 937 MG/DL (ref 0–150)
TSH, 3RD GENERATION: 1.09 UIU/ML (ref 0.27–4.2)
URINE CULTURE IF INDICATED: ABNORMAL
UROBILINOGEN UR QL STRIP.AUTO: 0.2 EU/DL (ref 0.2–1)
VLDLC SERPL CALC-MCNC: 187 MG/DL (ref 6–23)
WBC # BLD AUTO: 6.7 K/UL (ref 4.3–11.1)
WBC URNS QL MICRO: ABNORMAL /HPF (ref 0–4)

## 2025-06-17 PROCEDURE — 99395 PREV VISIT EST AGE 18-39: CPT | Performed by: INTERNAL MEDICINE

## 2025-06-17 PROCEDURE — 99214 OFFICE O/P EST MOD 30 MIN: CPT | Performed by: INTERNAL MEDICINE

## 2025-06-17 SDOH — ECONOMIC STABILITY: FOOD INSECURITY: WITHIN THE PAST 12 MONTHS, YOU WORRIED THAT YOUR FOOD WOULD RUN OUT BEFORE YOU GOT MONEY TO BUY MORE.: NEVER TRUE

## 2025-06-17 SDOH — ECONOMIC STABILITY: FOOD INSECURITY: WITHIN THE PAST 12 MONTHS, THE FOOD YOU BOUGHT JUST DIDN'T LAST AND YOU DIDN'T HAVE MONEY TO GET MORE.: NEVER TRUE

## 2025-06-17 ASSESSMENT — ENCOUNTER SYMPTOMS
DIARRHEA: 0
GASTROINTESTINAL NEGATIVE: 1
STRIDOR: 0
NAUSEA: 0
CHEST TIGHTNESS: 0
BACK PAIN: 0
ABDOMINAL PAIN: 0
ALLERGIC/IMMUNOLOGIC NEGATIVE: 1
SINUS PRESSURE: 0
EYE PAIN: 0
ABDOMINAL DISTENTION: 0
WHEEZING: 0
SHORTNESS OF BREATH: 0
ANAL BLEEDING: 0
EYE DISCHARGE: 0
CONSTIPATION: 0
RECTAL PAIN: 0
EYE ITCHING: 0
COUGH: 0
RESPIRATORY NEGATIVE: 1
BLOOD IN STOOL: 0
CHOKING: 0
COLOR CHANGE: 0
RHINORRHEA: 0
EYES NEGATIVE: 1
EYE REDNESS: 0

## 2025-06-17 NOTE — PROGRESS NOTES
Allergic/Immunologic: Negative.  Negative for environmental allergies, food allergies and immunocompromised state.   Neurological: Negative.  Negative for dizziness, tremors, seizures, syncope, facial asymmetry, light-headedness, numbness and headaches.   Hematological: Negative.    Psychiatric/Behavioral:  Negative for agitation, behavioral problems, confusion, decreased concentration, dysphoric mood, hallucinations, sleep disturbance and suicidal ideas. The patient is not nervous/anxious.    All other systems reviewed and are negative.      Objective:    There were no vitals taken for this visit.    Physical Exam  Vitals and nursing note reviewed.   Constitutional:       General: He is not in acute distress.     Appearance: Normal appearance. He is not ill-appearing or toxic-appearing.   HENT:      Head: Normocephalic and atraumatic.      Right Ear: Tympanic membrane, ear canal and external ear normal.      Left Ear: Tympanic membrane, ear canal and external ear normal.   Eyes:      General: No scleral icterus.        Right eye: No discharge.         Left eye: No discharge.      Extraocular Movements: Extraocular movements intact.      Conjunctiva/sclera: Conjunctivae normal.      Pupils: Pupils are equal, round, and reactive to light.   Cardiovascular:      Rate and Rhythm: Normal rate and regular rhythm.      Pulses: Normal pulses.      Heart sounds: Normal heart sounds. No murmur heard.     No friction rub. No gallop.   Pulmonary:      Effort: Pulmonary effort is normal. No respiratory distress.      Breath sounds: Normal breath sounds. No stridor. No wheezing, rhonchi or rales.   Chest:      Chest wall: No tenderness.   Abdominal:      General: Abdomen is flat. Bowel sounds are normal. There is no distension.      Palpations: There is no mass.      Tenderness: There is no abdominal tenderness. There is no guarding or rebound.      Hernia: No hernia is present.   Musculoskeletal:         General: No swelling,

## 2025-06-18 ENCOUNTER — TELEMEDICINE (OUTPATIENT)
Dept: PRIMARY CARE CLINIC | Facility: CLINIC | Age: 40
End: 2025-06-18
Payer: COMMERCIAL

## 2025-06-18 ENCOUNTER — RESULTS FOLLOW-UP (OUTPATIENT)
Dept: PRIMARY CARE CLINIC | Facility: CLINIC | Age: 40
End: 2025-06-18

## 2025-06-18 DIAGNOSIS — R79.89 ELEVATED LIVER FUNCTION TESTS: ICD-10-CM

## 2025-06-18 DIAGNOSIS — F33.1 MAJOR DEPRESSIVE DISORDER, RECURRENT, MODERATE (HCC): ICD-10-CM

## 2025-06-18 DIAGNOSIS — E11.65 TYPE 2 DIABETES MELLITUS WITH HYPERGLYCEMIA, WITHOUT LONG-TERM CURRENT USE OF INSULIN (HCC): ICD-10-CM

## 2025-06-18 DIAGNOSIS — G47.33 OSA (OBSTRUCTIVE SLEEP APNEA): Primary | ICD-10-CM

## 2025-06-18 DIAGNOSIS — E78.00 HIGH CHOLESTEROL: ICD-10-CM

## 2025-06-18 PROCEDURE — 3046F HEMOGLOBIN A1C LEVEL >9.0%: CPT | Performed by: INTERNAL MEDICINE

## 2025-06-18 PROCEDURE — 99215 OFFICE O/P EST HI 40 MIN: CPT | Performed by: INTERNAL MEDICINE

## 2025-06-18 RX ORDER — ROSUVASTATIN CALCIUM 10 MG/1
10 TABLET, COATED ORAL NIGHTLY
Qty: 30 TABLET | Refills: 3 | Status: SHIPPED | OUTPATIENT
Start: 2025-06-18

## 2025-06-18 ASSESSMENT — ENCOUNTER SYMPTOMS
RESPIRATORY NEGATIVE: 1
GASTROINTESTINAL NEGATIVE: 1

## 2025-06-18 NOTE — PROGRESS NOTES
FOLLOW UP VISIT    Subjective:    Eric Martino (: 1985) is a 39 y.o., male,   Chief Complaint   Patient presents with    Diabetes       HPI:  Diabetes        History of Present Illness  The patient presents via virtual visit for evaluation of diabetes, hypercholesterolemia, and elevated liver function.    Diabetes  - The patient acknowledges a diet high in carbohydrates and sugars.    Hypercholesterolemia  - The patient acknowledges a diet high in carbohydrates and sugars.    Elevated liver function  - The patient has a history of elevated liver function.  - This issue was previously managed by a gastroenterologist during their time in [place].  - The elevated liver function arose due to an incorrect dosage of Zyrtec, leading to excessive intake of the medication.    Supplemental information: They have been unwell for the past 2 weeks.    MEDICATIONS  Current: trazodone, Zyrtec     The following portions of the patient's history were reviewed and updated as appropriate:      Past Medical History:   Diagnosis Date    Depression     Headache        No past surgical history on file.    Family History   Problem Relation Age of Onset    Depression Mother     Diabetes Mother     Diabetes Father     Heart Disease Father     Cancer Maternal Grandfather         Liver    Alcohol Abuse Maternal Grandmother     Heart Disease Paternal Uncle        Social History     Socioeconomic History    Marital status:      Spouse name: Not on file    Number of children: Not on file    Years of education: Not on file    Highest education level: Not on file   Occupational History    Not on file   Tobacco Use    Smoking status: Never     Passive exposure: Never    Smokeless tobacco: Never   Substance and Sexual Activity    Alcohol use: Not Currently    Drug use: Never    Sexual activity: Yes     Partners: Female   Other Topics Concern    Not on file   Social History Narrative    Not on file     Social Drivers of Health

## 2025-06-19 ENCOUNTER — TELEPHONE (OUTPATIENT)
Dept: DIABETES SERVICES | Age: 40
End: 2025-06-19

## 2025-06-19 NOTE — TELEPHONE ENCOUNTER
Type [employer] . Spoke with patient regarding diabetes education consult. Patient currently declines program but provided contact numbers for diabetes educators should they change their mind in the future: [phone] or [phone].

## 2025-06-20 ENCOUNTER — HOSPITAL ENCOUNTER (OUTPATIENT)
Dept: ULTRASOUND IMAGING | Age: 40
Discharge: HOME | End: 2025-06-22
Payer: COMMERCIAL

## 2025-06-20 DIAGNOSIS — R79.89 ELEVATED LIVER FUNCTION TESTS: ICD-10-CM

## 2025-06-20 PROCEDURE — 76700 US EXAM ABDOM COMPLETE: CPT

## 2025-06-23 ENCOUNTER — RESULTS FOLLOW-UP (OUTPATIENT)
Dept: PRIMARY CARE CLINIC | Facility: CLINIC | Age: 40
End: 2025-06-23

## 2025-06-23 DIAGNOSIS — K76.0 FATTY LIVER: ICD-10-CM

## 2025-06-23 DIAGNOSIS — K76.0 FATTY LIVER: Primary | ICD-10-CM

## 2025-06-23 DIAGNOSIS — E11.65 TYPE 2 DIABETES MELLITUS WITH HYPERGLYCEMIA, WITHOUT LONG-TERM CURRENT USE OF INSULIN (HCC): Primary | ICD-10-CM

## 2025-06-23 RX ORDER — GLUCOSAMINE HCL/CHONDROITIN SU 500-400 MG
CAPSULE ORAL
Qty: 400 STRIP | Refills: 0 | Status: SHIPPED | OUTPATIENT
Start: 2025-06-23

## 2025-06-23 RX ORDER — AVOBENZONE, HOMOSALATE, OCTISALATE, OCTOCRYLENE 30; 40; 45; 26 MG/ML; MG/ML; MG/ML; MG/ML
1 CREAM TOPICAL 4 TIMES DAILY
Qty: 100 EACH | Refills: 5 | Status: CANCELLED | OUTPATIENT
Start: 2025-06-23

## 2025-06-23 RX ORDER — AVOBENZONE, HOMOSALATE, OCTISALATE, OCTOCRYLENE 30; 40; 45; 26 MG/ML; MG/ML; MG/ML; MG/ML
CREAM TOPICAL
Qty: 600 EACH | Refills: 1 | Status: SHIPPED | OUTPATIENT
Start: 2025-06-23

## 2025-06-23 RX ORDER — GLUCOSAMINE HCL/CHONDROITIN SU 500-400 MG
CAPSULE ORAL
Qty: 200 STRIP | Refills: 2 | Status: CANCELLED | OUTPATIENT
Start: 2025-06-23

## 2025-06-25 ENCOUNTER — HOSPITAL ENCOUNTER (OUTPATIENT)
Dept: PHYSICAL THERAPY | Age: 40
Setting detail: RECURRING SERIES
Discharge: HOME | End: 2025-06-28
Attending: INTERNAL MEDICINE
Payer: COMMERCIAL

## 2025-06-25 ENCOUNTER — TELEPHONE (OUTPATIENT)
Dept: PRIMARY CARE CLINIC | Facility: CLINIC | Age: 40
End: 2025-06-25

## 2025-06-25 DIAGNOSIS — R29.3 ABNORMAL POSTURE: ICD-10-CM

## 2025-06-25 DIAGNOSIS — M25.511 ACUTE PAIN OF RIGHT SHOULDER: Primary | ICD-10-CM

## 2025-06-25 PROCEDURE — 97530 THERAPEUTIC ACTIVITIES: CPT

## 2025-06-25 PROCEDURE — 97161 PT EVAL LOW COMPLEX 20 MIN: CPT

## 2025-06-25 ASSESSMENT — PAIN SCALES - GENERAL: PAINLEVEL_OUTOF10: 2

## 2025-06-25 NOTE — PROGRESS NOTES
Eric Martino  : 1985  Primary: [employer] ([facility])  Secondary:  [place] [address]  [address]  [facility] [address]  Phone: [phone]  Fax: [phone] Plan Frequency: twice a week for 8 weeks    Plan of Care/Certification Expiration Date: 25      Plan of Care/Certification Expiration Date:  Plan of Care/Certification Expiration Date: 25    Frequency/Duration: Plan Frequency: twice a week for 8 weeks      Time In/Out:   Time In: 1605  Time Out: 1648    PT Visit Info:    Total # of Visits to Date: 1        Visit Count:  1     OUTPATIENT PHYSICAL THERAPY:OP NOTE TYPE: Treatment Note 2025       Charge Capture   Episode  (R shoulder pain)              Treatment Diagnosis:    Acute pain of right shoulder  Abnormal posture  Medical/Referring Diagnosis:  Shoulder pain, unspecified chronicity, unspecified laterality  Pain of foot, unspecified laterality     Referring Physician:  Anupama Cabrera MD MD Orders:  PT Eval and Treat   Return MD Appt:  unspecified  Date of Onset:  Onset Date:  (3 months prior to initial evaluation)     Allergies:   Sulfa antibiotics  Restrictions/Precautions:    None  Interventions Planned See assessment note.        Subjective Assessment: See history from evaluation today.     Visit Note Counter: 1 last registered Total # of Visits to Date: 1        Initial Pain Level:}    2/10  Post Session Pain Level:        (no change in pain reported)/10  Medications Last Reviewed:  2025  Updated Objective Findings:  See Evaluation Note from today  Treatment   In addition to assessment today, the following treatments were provided:   Therapeutic Activity:  ( 13 minutes):  Pt education for initial HEP (scaption, isometric and dynamic shoulder ER), body mechanics, postural awareness (especially while sitting), findings from evaluation, anatomy and physiology of present condition/healing time, and PT role and POC.

## 2025-06-25 NOTE — THERAPY EVALUATION
Increased Pain, Decreased Strength, Decreased Functional Mobility, Decreased [place] with Home Exercise Program, Decreased Posture, Decreased Body Mechanics, Difficulty Sleeping, and Decreased Activity Tolerance/Endurance*     Therapy Prognosis:         Excellent     Initial Assessment Complexity:         Low Complexity    PLAN   Effective Dates: 6/25/2025 TO Plan of Care/Certification Expiration Date: 08/20/25     Frequency/Duration: Plan Frequency: twice a week for 8 weeks     Interventions Planned (Treatment may consist of any combination of the following):    Bed Mobility, Endurance Training, Functional Mobility Training, Home Exercise Program (HEP), Manual Therapy, Neuromuscular Re-education/Strengthening, Pain Management, Therapeutic Activites, Therapeutic Exercise/Strengthening, and Patient/Caregiver Education & Training       Goals: (Goals have been discussed and agreed upon with patient.)  Discharge Goals: Time Frame: 8 weeks  Pt will be independent with HEP in order to increase UE strength/endurance/mobility to improve functional mobility and overall quality of life.  Pt will improve score on the Disabilities of the Arm, Shoulder, and Hand (DASH) Questionnaire to 14/55 in order to improve independence with ADLs and IADLs and to improve overall quality of life.  Pt will report lying on either side for 30 minutes with minimal to no increase in pain in order to improve quality and duration of sleep.             Medical Necessity:   > Patient is expected to demonstrate progress in strength, range of motion, and functional technique to improve ability to perform pain-free ADLs/IADLs and to improve overall quality of life.  Reason For Services/Other Comments:  > Patient continues to require modification of therapeutic interventions to increase complexity of exercises.    Total Duration:  Time In: 1605  Time Out: 1648    Regarding Eric Martino's therapy, I certify that the treatment plan above will be

## 2025-07-01 ENCOUNTER — TELEPHONE (OUTPATIENT)
Dept: PRIMARY CARE CLINIC | Facility: CLINIC | Age: 40
End: 2025-07-01

## 2025-07-15 ENCOUNTER — HOSPITAL ENCOUNTER (OUTPATIENT)
Dept: PHYSICAL THERAPY | Age: 40
Setting detail: RECURRING SERIES
Discharge: HOME | End: 2025-07-18
Attending: INTERNAL MEDICINE
Payer: COMMERCIAL

## 2025-07-15 PROCEDURE — 97110 THERAPEUTIC EXERCISES: CPT

## 2025-07-15 PROCEDURE — 97140 MANUAL THERAPY 1/> REGIONS: CPT

## 2025-07-15 ASSESSMENT — PAIN SCALES - GENERAL: PAINLEVEL_OUTOF10: 2

## 2025-07-15 NOTE — PROGRESS NOTES
Eric Gunner  : 1985  Primary: [employer] ([facility])  Secondary:  [place] [address]  [address]  [facility] [address]  Phone: [phone]  Fax: [phone] Plan Frequency: twice a week for 8 weeks    Plan of Care/Certification Expiration Date: 25      Plan of Care/Certification Expiration Date:  Plan of Care/Certification Expiration Date: 25    Frequency/Duration: Plan Frequency: twice a week for 8 weeks      Time In/Out:   Time In: 802  Time Out: 846    PT Visit Info:    Total # of Visits to Date: 2  No Show: 1        Visit Count:  2     OUTPATIENT PHYSICAL THERAPY:OP NOTE TYPE: Treatment Note 7/15/2025       Charge Capture   Episode  (R shoulder pain)              Treatment Diagnosis:    Acute pain of right shoulder  Abnormal posture  Medical/Referring Diagnosis:  Shoulder pain, unspecified chronicity, unspecified laterality  Pain of foot, unspecified laterality     Referring Physician:  Anupama Cabrera MD MD Orders:  PT Eval and Treat   Return MD Appt:  unspecified  Date of Onset:  Onset Date:  (3 months prior to initial evaluation)     Allergies:   Sulfa antibiotics  Restrictions/Precautions:    None  Interventions Planned See assessment note.        Subjective Assessment: Pt states the shoulder pain is not very different from first session.    Visit Note Counter: 2 last registered Total # of Visits to Date: 2        Initial Pain Level:}    2/10  Post Session Pain Level:       2/10  Medications Last Reviewed:  7/15/2025  Updated Objective Findings:  None Today  Treatment   Therapeutic Exercise: ( 24 minutes):  Exercises per grid below to improve mobility and strength and dynamic movement of right shoulder to improve lifting, carrying, reaching, and overhead activites.  Required visual, verbal, and tactile cues to promote proper body alignment, promote proper body posture, promote proper body mechanics, and promote proper body breathing

## 2025-07-16 ENCOUNTER — OFFICE VISIT (OUTPATIENT)
Dept: PRIMARY CARE CLINIC | Facility: CLINIC | Age: 40
End: 2025-07-16
Payer: COMMERCIAL

## 2025-07-16 ENCOUNTER — PATIENT MESSAGE (OUTPATIENT)
Dept: PRIMARY CARE CLINIC | Facility: CLINIC | Age: 40
End: 2025-07-16

## 2025-07-16 VITALS
WEIGHT: 238.2 LBS | OXYGEN SATURATION: 99 % | HEART RATE: 78 BPM | BODY MASS INDEX: 33.35 KG/M2 | SYSTOLIC BLOOD PRESSURE: 128 MMHG | DIASTOLIC BLOOD PRESSURE: 72 MMHG | HEIGHT: 71 IN

## 2025-07-16 DIAGNOSIS — E11.65 TYPE 2 DIABETES MELLITUS WITH HYPERGLYCEMIA, WITHOUT LONG-TERM CURRENT USE OF INSULIN (HCC): ICD-10-CM

## 2025-07-16 DIAGNOSIS — F33.1 MAJOR DEPRESSIVE DISORDER, RECURRENT, MODERATE (HCC): ICD-10-CM

## 2025-07-16 DIAGNOSIS — E78.00 HIGH CHOLESTEROL: ICD-10-CM

## 2025-07-16 DIAGNOSIS — K76.0 FATTY LIVER: ICD-10-CM

## 2025-07-16 DIAGNOSIS — G47.33 OSA (OBSTRUCTIVE SLEEP APNEA): Primary | ICD-10-CM

## 2025-07-16 LAB
ALBUMIN SERPL-MCNC: 4 G/DL (ref 3.5–5)
ALBUMIN/GLOB SERPL: 1.4 (ref 1–1.9)
ALP SERPL-CCNC: 109 U/L (ref 40–129)
ALT SERPL-CCNC: 118 U/L (ref 8–55)
ANION GAP SERPL CALC-SCNC: 13 MMOL/L (ref 7–16)
AST SERPL-CCNC: 50 U/L (ref 15–37)
BILIRUB DIRECT SERPL-MCNC: 0.2 MG/DL (ref 0–0.3)
BILIRUB SERPL-MCNC: 0.5 MG/DL (ref 0–1.2)
BUN SERPL-MCNC: 18 MG/DL (ref 6–23)
CALCIUM SERPL-MCNC: 9.2 MG/DL (ref 8.8–10.2)
CHLORIDE SERPL-SCNC: 102 MMOL/L (ref 98–107)
CHOLEST SERPL-MCNC: 115 MG/DL (ref 0–200)
CO2 SERPL-SCNC: 23 MMOL/L (ref 20–29)
CREAT SERPL-MCNC: 0.96 MG/DL (ref 0.8–1.3)
EST. AVERAGE GLUCOSE BLD GHB EST-MCNC: 207 MG/DL
GLOBULIN SER CALC-MCNC: 2.8 G/DL (ref 2.3–3.5)
GLUCOSE SERPL-MCNC: 193 MG/DL (ref 70–99)
HBA1C MFR BLD: 8.9 % (ref 0–5.6)
HDLC SERPL-MCNC: 30 MG/DL (ref 40–60)
HDLC SERPL: 3.8 (ref 0–5)
LDLC SERPL CALC-MCNC: 40 MG/DL (ref 0–100)
POTASSIUM SERPL-SCNC: 4.5 MMOL/L (ref 3.5–5.1)
PROT SERPL-MCNC: 6.9 G/DL (ref 6.3–8.2)
SODIUM SERPL-SCNC: 138 MMOL/L (ref 136–145)
TRIGL SERPL-MCNC: 223 MG/DL (ref 0–150)
VLDLC SERPL CALC-MCNC: 45 MG/DL (ref 6–23)

## 2025-07-16 PROCEDURE — 3046F HEMOGLOBIN A1C LEVEL >9.0%: CPT | Performed by: INTERNAL MEDICINE

## 2025-07-16 PROCEDURE — 99214 OFFICE O/P EST MOD 30 MIN: CPT | Performed by: INTERNAL MEDICINE

## 2025-07-16 RX ORDER — ROSUVASTATIN CALCIUM 10 MG/1
10 TABLET, COATED ORAL NIGHTLY
Qty: 90 TABLET | Refills: 0 | Status: SHIPPED | OUTPATIENT
Start: 2025-07-16

## 2025-07-16 ASSESSMENT — ENCOUNTER SYMPTOMS: RESPIRATORY NEGATIVE: 1

## 2025-07-16 NOTE — PROGRESS NOTES
FOLLOW UP VISIT    Subjective:    Eric Martino (: 1985) is a 39 y.o., male,   Chief Complaint   Patient presents with    Medication Refill    Diabetes    Follow-up       HPI:  Medication Refill    Diabetes          History of Present Illness  The patient presents for a follow-up of diabetes, sleep apnea, and elevated cholesterol.    Diabetes  - They have not been able to obtain their Ozempic medication due to insurance issues.  - They did not bring their blood sugar recordings to this appointment.  - Their weight has remained stable since the last visit.  - They have made dietary changes, including eliminating sodas and refined sugar from their diet.  - They are currently on metformin 500 mg.    Sleep Apnea  - They have sleep apnea but are not using a CPAP machine.    Elevated Cholesterol  - They are on Crestor 10 mg.    Supplemental Information  - They are taking trazodone.       The following portions of the patient's history were reviewed and updated as appropriate:      Past Medical History:   Diagnosis Date    Depression     Headache        No past surgical history on file.    Family History   Problem Relation Age of Onset    Depression Mother     Diabetes Mother     Diabetes Father     Heart Disease Father     Cancer Maternal Grandfather         Liver    Alcohol Abuse Maternal Grandmother     Heart Disease Paternal Uncle        Social History     Socioeconomic History    Marital status:      Spouse name: Not on file    Number of children: Not on file    Years of education: Not on file    Highest education level: Not on file   Occupational History    Not on file   Tobacco Use    Smoking status: Never     Passive exposure: Never    Smokeless tobacco: Never   Substance and Sexual Activity    Alcohol use: Not Currently    Drug use: Never    Sexual activity: Yes     Partners: Female   Other Topics Concern    Not on file   Social History Narrative    Not on file     Social Drivers of Health

## 2025-07-17 RX ORDER — GLUCOSAMINE HCL/CHONDROITIN SU 500-400 MG
CAPSULE ORAL
Qty: 200 STRIP | Refills: 3 | Status: SHIPPED | OUTPATIENT
Start: 2025-07-17

## 2025-07-18 ENCOUNTER — HOSPITAL ENCOUNTER (OUTPATIENT)
Dept: PHYSICAL THERAPY | Age: 40
Setting detail: RECURRING SERIES
Discharge: HOME | End: 2025-07-21
Attending: INTERNAL MEDICINE
Payer: COMMERCIAL

## 2025-07-18 PROCEDURE — 97140 MANUAL THERAPY 1/> REGIONS: CPT

## 2025-07-18 PROCEDURE — 97110 THERAPEUTIC EXERCISES: CPT

## 2025-07-18 ASSESSMENT — PAIN SCALES - GENERAL: PAINLEVEL_OUTOF10: 1

## 2025-07-18 NOTE — PROGRESS NOTES
Eric Gunner  : 1985  Primary: [employer] ([employer]  Secondary:  [place] [address]  [address]  [facility] [address]  Phone: [phone]  Fax: [phone] Plan Frequency: twice a week for 8 weeks    Plan of Care/Certification Expiration Date: 25      Plan of Care/Certification Expiration Date:  Plan of Care/Certification Expiration Date: 25    Frequency/Duration: Plan Frequency: twice a week for 8 weeks      Time In/Out:   Time In: 803  Time Out: 844    PT Visit Info:    Total # of Visits to Date: 3  No Show: 1        Visit Count:  3     OUTPATIENT PHYSICAL THERAPY:OP NOTE TYPE: Treatment Note 2025       Charge Capture   Episode  (R shoulder pain)              Treatment Diagnosis:    Acute pain of right shoulder  Abnormal posture  Medical/Referring Diagnosis:  Shoulder pain, unspecified chronicity, unspecified laterality  Pain of foot, unspecified laterality     Referring Physician:  Anupama Cabrera MD MD Orders:  PT Eval and Treat   Return MD Appt:  unspecified  Date of Onset:  Onset Date:  (3 months prior to initial evaluation)     Allergies:   Sulfa antibiotics  Restrictions/Precautions:    None  Interventions Planned See assessment note.        Subjective Assessment: Pt states overall the pain has been bothering him less since he started therapy, even if he has been sleeping on it    Visit Note Counter: 3 last registered Total # of Visits to Date: 3        Initial Pain Level:}    1 (1-2)/10  Post Session Pain Level:       2/10  Medications Last Reviewed:  2025  Updated Objective Findings:  None Today  Treatment   Therapeutic Exercise: ( 35 minutes):  Exercises per grid below to improve mobility and strength and dynamic movement of right shoulder to improve lifting, carrying, reaching, and overhead activites.  Required visual, verbal, and tactile cues to promote proper body alignment, promote proper body posture, promote proper body

## 2025-07-22 ENCOUNTER — HOSPITAL ENCOUNTER (OUTPATIENT)
Dept: PHYSICAL THERAPY | Age: 40
Setting detail: RECURRING SERIES
Discharge: HOME | End: 2025-07-25
Attending: INTERNAL MEDICINE
Payer: COMMERCIAL

## 2025-07-22 PROCEDURE — 97110 THERAPEUTIC EXERCISES: CPT

## 2025-07-22 ASSESSMENT — PAIN SCALES - GENERAL: PAINLEVEL_OUTOF10: 3

## 2025-07-22 NOTE — PROGRESS NOTES
Eric Martino  : 1985  Primary: [employer])  Secondary:  [place] [address]  [address]  [facility] [address]  Phone: [phone]  Fax: [phone] Plan Frequency: twice a week for 8 weeks    Plan of Care/Certification Expiration Date: 25      Plan of Care/Certification Expiration Date:  Plan of Care/Certification Expiration Date: 25    Frequency/Duration: Plan Frequency: twice a week for 8 weeks      Time In/Out:   Time In: 803  Time Out: 844    PT Visit Info:    Total # of Visits to Date: 4  No Show: 1        Visit Count:  4     OUTPATIENT PHYSICAL THERAPY:OP NOTE TYPE: Treatment Note 2025       Charge Capture   Episode  (R shoulder pain)              Treatment Diagnosis:    Acute pain of right shoulder  Abnormal posture  Medical/Referring Diagnosis:  Shoulder pain, unspecified chronicity, unspecified laterality  Pain of foot, unspecified laterality     Referring Physician:  Anupama Cabrera MD MD Orders:  PT Eval and Treat   Return MD Appt:  unspecified  Date of Onset:  Onset Date:  (3 months prior to initial evaluation)     Allergies:   Sulfa antibiotics  Restrictions/Precautions:    None  Interventions Planned See assessment note.        Subjective Assessment: Pt states he has been feeling better overall but did sleep on his shoulder \"funny\" last night so is hurting a little more today    Visit Note Counter: 4 last registered Total # of Visits to Date: 4        Initial Pain Level:}    3/10  Post Session Pain Level:        (no pain reported)/10  Medications Last Reviewed:  2025  Updated Objective Findings:  None Today  Treatment   Therapeutic Exercise: ( 40 minutes):  Exercises per grid below to improve mobility and strength and dynamic movement of right shoulder to improve lifting, carrying, reaching, and overhead activites.  Required visual, verbal, and tactile cues to promote proper body alignment, promote proper body

## 2025-07-25 ENCOUNTER — HOSPITAL ENCOUNTER (OUTPATIENT)
Dept: PHYSICAL THERAPY | Age: 40
Setting detail: RECURRING SERIES
Discharge: HOME | End: 2025-07-28
Attending: INTERNAL MEDICINE
Payer: COMMERCIAL

## 2025-07-25 PROCEDURE — 97110 THERAPEUTIC EXERCISES: CPT

## 2025-07-25 NOTE — PROGRESS NOTES
Eric Gunner  : 1985  Primary: [employer]  Secondary:  [place] [address]  [address]  [facility] [address]  Phone: [phone]  Fax: [phone] Plan Frequency: twice a week for 8 weeks    Plan of Care/Certification Expiration Date: 25      Plan of Care/Certification Expiration Date:  Plan of Care/Certification Expiration Date: 25    Frequency/Duration: Plan Frequency: twice a week for 8 weeks      Time In/Out:   Time In: 803  Time Out: 846    PT Visit Info:    Total # of Visits to Date: 5  No Show: 1        Visit Count:  5     OUTPATIENT PHYSICAL THERAPY:OP NOTE TYPE: Treatment Note 2025       Charge Capture   Episode  (R shoulder pain)              Treatment Diagnosis:    Acute pain of right shoulder  Abnormal posture  Medical/Referring Diagnosis:  Shoulder pain, unspecified chronicity, unspecified laterality  Pain of foot, unspecified laterality     Referring Physician:  Anupama Cabrera MD MD Orders:  PT Eval and Treat   Return MD Appt:  unspecified  Date of Onset:  Onset Date:  (3 months prior to initial evaluation)     Allergies:   Sulfa antibiotics  Restrictions/Precautions:    None  Interventions Planned See assessment note.        Subjective Assessment: Pt states he was a little more sore after last session (thinks it might have been the wall clocks).    Visit Note Counter: 5 last registered Total # of Visits to Date: 5        Initial Pain Level:}     (no pain reported)/10  Post Session Pain Level:        (no pain reported)/10  Medications Last Reviewed:  2025  Updated Objective Findings:  None Today  Treatment   Therapeutic Exercise: ( 43 minutes):  Exercises per grid below to improve mobility and strength and dynamic movement of right shoulder to improve lifting, carrying, reaching, and overhead activites.  Required visual, verbal, and tactile cues to promote proper body alignment, promote proper body posture, promote 
Patient

## 2025-07-29 ENCOUNTER — HOSPITAL ENCOUNTER (OUTPATIENT)
Dept: PHYSICAL THERAPY | Age: 40
Setting detail: RECURRING SERIES
Discharge: HOME | End: 2025-08-01
Attending: INTERNAL MEDICINE
Payer: COMMERCIAL

## 2025-07-29 PROCEDURE — 97110 THERAPEUTIC EXERCISES: CPT

## 2025-07-29 ASSESSMENT — PAIN SCALES - GENERAL: PAINLEVEL_OUTOF10: 0

## 2025-07-29 NOTE — PROGRESS NOTES
Eric Martino  : 1985  Primary: [employer]  Secondary:  [place] [address]  [address]  [facility] [address]  Phone: [phone]  Fax: [phone] Plan Frequency: twice a week for 8 weeks    Plan of Care/Certification Expiration Date: 25      Plan of Care/Certification Expiration Date:  Plan of Care/Certification Expiration Date: 25    Frequency/Duration: Plan Frequency: twice a week for 8 weeks      Time In/Out:   Time In: 802  Time Out: 844    PT Visit Info:    Total # of Visits to Date: 6  No Show: 1        Visit Count:  6     OUTPATIENT PHYSICAL THERAPY:OP NOTE TYPE: Treatment Note 2025       Charge Capture   Episode  (R shoulder pain)              Treatment Diagnosis:    Acute pain of right shoulder  Abnormal posture  Medical/Referring Diagnosis:  Shoulder pain, unspecified chronicity, unspecified laterality  Pain of foot, unspecified laterality     Referring Physician:  Anupama Cabrera MD MD Orders:  PT Eval and Treat   Return MD Appt:  unspecified  Date of Onset:  Onset Date:  (3 months prior to initial evaluation)     Allergies:   Sulfa antibiotics  Restrictions/Precautions:    None  Interventions Planned See assessment note.        Subjective Assessment: Pt states he was able to sleep without pain last night as well as the night before. States he starts back at school next week so he will most likely only be able to come once every week following that.    Visit Note Counter: 6 last registered Total # of Visits to Date: 6        Initial Pain Level:}    0/10  Post Session Pain Level:       2/10  Medications Last Reviewed:  2025  Updated Objective Findings:  None Today  Treatment   Therapeutic Exercise: ( 42 minutes):  Exercises per grid below to improve mobility and strength and dynamic movement of right shoulder to improve lifting, carrying, reaching, and overhead activites.  Required visual, verbal, and tactile cues to

## 2025-07-30 ENCOUNTER — OFFICE VISIT (OUTPATIENT)
Dept: PRIMARY CARE CLINIC | Facility: CLINIC | Age: 40
End: 2025-07-30
Payer: COMMERCIAL

## 2025-07-30 VITALS — WEIGHT: 236 LBS | BODY MASS INDEX: 32.92 KG/M2

## 2025-07-30 DIAGNOSIS — E11.65 TYPE 2 DIABETES MELLITUS WITH HYPERGLYCEMIA, WITHOUT LONG-TERM CURRENT USE OF INSULIN (HCC): ICD-10-CM

## 2025-07-30 PROCEDURE — 99214 OFFICE O/P EST MOD 30 MIN: CPT | Performed by: INTERNAL MEDICINE

## 2025-07-30 PROCEDURE — 3052F HG A1C>EQUAL 8.0%<EQUAL 9.0%: CPT | Performed by: INTERNAL MEDICINE

## 2025-07-30 ASSESSMENT — ENCOUNTER SYMPTOMS
RESPIRATORY NEGATIVE: 1
GASTROINTESTINAL NEGATIVE: 1

## 2025-07-30 NOTE — PROGRESS NOTES
FOLLOW UP VISIT    Subjective:    Eric Martino (: 1985) is a 39 y.o., male,   Chief Complaint   Patient presents with    Diabetes    Medication Check       HPI:  Diabetes        History of Present Illness  The patient presents for evaluation of diabetes.    Diabetes  - Their blood glucose levels range from 120 to 150 when fasting and increase to between 160 and 210 two hours post-meal, with occasional spikes up to 230.  - These higher readings are typically associated with carbohydrate-rich meals.  - They are currently on a regimen of Ozempic 0.25 mg.    Sleep Apnea  - They have sleep apnea and are on trazodone for sleep.    Cholesterol Management  - They are on Crestor for cholesterol management.    Other Medications  - They are on Zyrtec.     The following portions of the patient's history were reviewed and updated as appropriate:      Past Medical History:   Diagnosis Date    Depression     Headache        No past surgical history on file.    Family History   Problem Relation Age of Onset    Depression Mother     Diabetes Mother     Diabetes Father     Heart Disease Father     Cancer Maternal Grandfather         Liver    Alcohol Abuse Maternal Grandmother     Heart Disease Paternal Uncle        Social History     Socioeconomic History    Marital status:      Spouse name: Not on file    Number of children: Not on file    Years of education: Not on file    Highest education level: Not on file   Occupational History    Not on file   Tobacco Use    Smoking status: Never     Passive exposure: Never    Smokeless tobacco: Never   Substance and Sexual Activity    Alcohol use: Not Currently    Drug use: Never    Sexual activity: Yes     Partners: Female   Other Topics Concern    Not on file   Social History Narrative    Not on file     Social Drivers of Health     Financial Resource Strain: Low Risk  (2024)    Overall Financial Resource Strain (CARDIA)     Difficulty of Paying Living Expenses: Not

## 2025-08-01 ENCOUNTER — HOSPITAL ENCOUNTER (OUTPATIENT)
Dept: PHYSICAL THERAPY | Age: 40
Setting detail: RECURRING SERIES
Discharge: HOME | End: 2025-08-04
Attending: INTERNAL MEDICINE
Payer: COMMERCIAL

## 2025-08-01 PROCEDURE — 97110 THERAPEUTIC EXERCISES: CPT

## 2025-08-01 ASSESSMENT — PAIN SCALES - GENERAL: PAINLEVEL_OUTOF10: 2

## 2025-08-05 ENCOUNTER — APPOINTMENT (OUTPATIENT)
Dept: PHYSICAL THERAPY | Age: 40
End: 2025-08-05
Attending: INTERNAL MEDICINE
Payer: COMMERCIAL

## 2025-08-07 ENCOUNTER — APPOINTMENT (OUTPATIENT)
Dept: PHYSICAL THERAPY | Age: 40
End: 2025-08-07
Attending: INTERNAL MEDICINE
Payer: COMMERCIAL